# Patient Record
Sex: FEMALE | Race: WHITE | NOT HISPANIC OR LATINO | URBAN - METROPOLITAN AREA
[De-identification: names, ages, dates, MRNs, and addresses within clinical notes are randomized per-mention and may not be internally consistent; named-entity substitution may affect disease eponyms.]

---

## 2022-05-18 ENCOUNTER — EMERGENCY (EMERGENCY)
Facility: HOSPITAL | Age: 62
LOS: 1 days | Discharge: HOME | End: 2022-05-18
Attending: EMERGENCY MEDICINE | Admitting: EMERGENCY MEDICINE
Payer: SELF-PAY

## 2022-05-18 VITALS
OXYGEN SATURATION: 99 % | WEIGHT: 136.69 LBS | TEMPERATURE: 98 F | DIASTOLIC BLOOD PRESSURE: 79 MMHG | HEART RATE: 87 BPM | HEIGHT: 68.11 IN | SYSTOLIC BLOOD PRESSURE: 170 MMHG | RESPIRATION RATE: 18 BRPM

## 2022-05-18 DIAGNOSIS — S52.501A UNSPECIFIED FRACTURE OF THE LOWER END OF RIGHT RADIUS, INITIAL ENCOUNTER FOR CLOSED FRACTURE: ICD-10-CM

## 2022-05-18 DIAGNOSIS — M25.521 PAIN IN RIGHT ELBOW: ICD-10-CM
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
## 2022-05-18 PROCEDURE — 99053 MED SERV 10PM-8AM 24 HR FAC: CPT

## 2022-05-18 PROCEDURE — 99284 EMERGENCY DEPT VISIT MOD MDM: CPT

## 2022-05-18 RX ORDER — IBUPROFEN 200 MG
600 TABLET ORAL ONCE
Refills: 0 | Status: COMPLETED | OUTPATIENT
Start: 2022-05-18 | End: 2022-05-18

## 2022-05-18 RX ADMIN — Medication 600 MILLIGRAM(S): at 23:43

## 2022-05-18 NOTE — ED PROVIDER NOTE - PATIENT PORTAL LINK FT
You can access the FollowMyHealth Patient Portal offered by [facility] by registering at the following website: [email or web address]. By joining [facility]’s FollowMyHealth portal, you will also be able to view your health information using other applications (apps) compatible with our system.

## 2022-05-18 NOTE — ED PROVIDER NOTE - UPPER EXTREMITY EXAM, RIGHT
lateral abrasion to elbow, with edema, limited ROM with pronation and supination, limited extension, elbow held in flexion, distal medial/radial/ulnar nn intact to motor and sensory./BRUISING/JOINT SWELLING/SWELLING/TENDERNESS

## 2022-05-18 NOTE — ED ADULT TRIAGE NOTE - CHIEF COMPLAINT QUOTE
Pt walked into ER c/o pain to right elbow and left ankle after trip and fall on the curb this afternoon 1600. [facility]. Noted deformity to right elbow with limited ROM, +distal pulses. Noted bruising to left ankle but Pt able to ambulate without issue. No PMH/NKDA.

## 2022-05-18 NOTE — ED ADULT NURSE NOTE - CHIEF COMPLAINT QUOTE
Pt walked into ER c/o pain to right elbow and left ankle after trip and fall on the curb this afternoon 1600. [facility]. Noted deformity to right elbow with limited ROM, +distal pulses. Noted bruising to left ankle but Pt able to ambulate without issue. No PMH/NKDA.

## 2022-05-18 NOTE — ED PROVIDER NOTE - OBJECTIVE STATEMENT
63 yo F, no pmhx, presents with R elbow pain, swelling, abrasion, unable to move secondary to fall onto R UE earlier this afternoon while walking. denies head o neck injury,. notes she also twisted the L ankle, and mildly swollen, but able to walk on it with no issues. notes pain worsened during the day, and now right elbow unable to move it. denies numbness, paresthesias, denies weakness. R hand dominant.

## 2022-05-18 NOTE — ED PROVIDER NOTE - CARE PLAN
Principal Discharge DX:	Injury of elbow, right   1 Principal Discharge DX:	Occult fracture of right elbow

## 2022-05-18 NOTE — ED ADULT NURSE NOTE - OBJECTIVE STATEMENT
pt is A&ox4, pt presented to the ER for pain in her elbow post fall, pulses+. romx4, resp even and unlabored, will continue to monitor

## 2022-05-18 NOTE — ED PROVIDER NOTE - CLINICAL SUMMARY MEDICAL DECISION MAKING FREE TEXT BOX
nsaids, xrays, patient presents with R elbow limited rom, with pronation supination, lateral edema, likely fracture, immobilize. patient walking on ankle, mildly sprained, does not want xrays.

## 2022-05-19 PROCEDURE — 73080 X-RAY EXAM OF ELBOW: CPT | Mod: 26,RT

## 2022-05-19 RX ORDER — TRAMADOL HYDROCHLORIDE 50 MG/1
1 TABLET ORAL
Qty: 16 | Refills: 0
Start: 2022-05-19 | End: 2022-05-22

## 2022-05-19 RX ORDER — TRAMADOL HYDROCHLORIDE 50 MG/1
50 TABLET ORAL ONCE
Refills: 0 | Status: DISCONTINUED | OUTPATIENT
Start: 2022-05-19 | End: 2022-05-19

## 2022-05-19 RX ADMIN — TRAMADOL HYDROCHLORIDE 50 MILLIGRAM(S): 50 TABLET ORAL at 00:55

## 2023-12-21 NOTE — ED ADULT NURSE NOTE - CAS DISCH TRANSFER METHOD
"Subjective:      Patient ID: Christie Bay is a 39 y.o. female.    Vitals:  height is 5' 6" (1.676 m) and weight is 78.9 kg (174 lb). Her oral temperature is 98.4 °F (36.9 °C). Her blood pressure is 110/75. Her respiration is 20 and oxygen saturation is 98%.     Chief Complaint: Nasal Congestion (Possible ear infection - Entered by patient)    Pt states she has nasal congestion and a possible ear infection that started 2 days ago, she also states she feels like she has an ear infection, no cough, no sore throat, and no body aches. Says she blew her nose and felt her eardrum pop. Decongestant meds were taken to help treat.    Sinus Problem  This is a new problem. Episode onset: 2 days. The problem has been gradually worsening since onset. There has been no fever. She is experiencing no pain. Associated symptoms include ear pain and sneezing. Past treatments include nasal decongestants.       HENT:  Positive for ear pain.    Allergic/Immunologic: Positive for sneezing.      Objective:     Physical Exam   Constitutional: She is oriented to person, place, and time. She appears well-developed. She is cooperative.  Non-toxic appearance. She does not appear ill. No distress.   HENT:   Head: Normocephalic and atraumatic.   Ears:   Right Ear: Hearing, tympanic membrane, external ear and ear canal normal.   Left Ear: Hearing, external ear and ear canal normal.      Comments: Left tm is red and swollen   Nose: Congestion present. No mucosal edema, rhinorrhea or nasal deformity. No epistaxis. Right sinus exhibits no maxillary sinus tenderness and no frontal sinus tenderness. Left sinus exhibits no maxillary sinus tenderness and no frontal sinus tenderness.   Mouth/Throat: Uvula is midline, oropharynx is clear and moist and mucous membranes are normal. Mucous membranes are moist. No trismus in the jaw. Normal dentition. No uvula swelling. No oropharyngeal exudate, posterior oropharyngeal edema or posterior " oropharyngeal erythema. Oropharynx is clear.   Eyes: Conjunctivae and lids are normal. No scleral icterus.   Neck: Trachea normal and phonation normal. Neck supple. No edema present. No erythema present. No neck rigidity present.   Cardiovascular: Normal rate, regular rhythm, normal heart sounds and normal pulses.   Pulmonary/Chest: Effort normal and breath sounds normal. No respiratory distress. She has no decreased breath sounds. She has no rhonchi.   Abdominal: Normal appearance.   Musculoskeletal: Normal range of motion.         General: No deformity or edema. Normal range of motion.   Neurological: She is alert and oriented to person, place, and time. She exhibits normal muscle tone. Coordination normal.   Skin: Skin is warm, dry, intact, not diaphoretic and not pale.   Psychiatric: Her speech is normal and behavior is normal. Judgment and thought content normal.   Nursing note and vitals reviewed.      Assessment:     1. Non-recurrent acute serous otitis media of left ear    2. Otalgia, unspecified laterality        Plan:       Non-recurrent acute serous otitis media of left ear  -    -     amoxicillin-clavulanate 875-125mg (AUGMENTIN) 875-125 mg per tablet; Take 1 tablet by mouth 2 (two) times daily. for 10 days  Dispense: 20 tablet; Refill: 0    Otalgia, unspecified laterality  -     SARS Coronavirus 2 Antigen, POCT Manual Read  -     amoxicillin-clavulanate 875-125mg (AUGMENTIN) 875-125 mg per tablet; Take 1 tablet by mouth 2 (two) times daily. for 10 days  Dispense: 20 tablet; Refill: 0    Pt or guardian provided educational materials and instructions regarding their visit diagnosis.                   Transportation service